# Patient Record
Sex: MALE | Race: WHITE | Employment: UNEMPLOYED | URBAN - METROPOLITAN AREA
[De-identification: names, ages, dates, MRNs, and addresses within clinical notes are randomized per-mention and may not be internally consistent; named-entity substitution may affect disease eponyms.]

---

## 2023-08-01 ENCOUNTER — HOSPITAL ENCOUNTER (EMERGENCY)
Age: 23
Discharge: HOME | End: 2023-08-01
Attending: EMERGENCY MEDICINE
Payer: COMMERCIAL

## 2023-08-01 VITALS
BODY MASS INDEX: 43.7 KG/M2 | HEIGHT: 67 IN | HEART RATE: 88 BPM | DIASTOLIC BLOOD PRESSURE: 115 MMHG | RESPIRATION RATE: 19 BRPM | WEIGHT: 278.4 LBS | OXYGEN SATURATION: 99 % | SYSTOLIC BLOOD PRESSURE: 131 MMHG

## 2023-08-01 DIAGNOSIS — E16.2 HYPOGLYCEMIA: Primary | ICD-10-CM

## 2023-08-01 LAB
CHP ED QC CHECK: NORMAL
GLUCOSE BLD-MCNC: 223 MG/DL
GLUCOSE BLD-MCNC: 223 MG/DL (ref 70–99)
GLUCOSE BLD-MCNC: 76 MG/DL (ref 70–99)
PERFORMED ON: ABNORMAL
PERFORMED ON: NORMAL

## 2023-08-01 PROCEDURE — 99283 EMERGENCY DEPT VISIT LOW MDM: CPT

## 2023-08-01 ASSESSMENT — LIFESTYLE VARIABLES
HOW OFTEN DO YOU HAVE A DRINK CONTAINING ALCOHOL: NEVER
HOW MANY STANDARD DRINKS CONTAINING ALCOHOL DO YOU HAVE ON A TYPICAL DAY: PATIENT DOES NOT DRINK

## 2023-08-01 ASSESSMENT — PAIN - FUNCTIONAL ASSESSMENT: PAIN_FUNCTIONAL_ASSESSMENT: NONE - DENIES PAIN

## 2023-08-01 NOTE — DISCHARGE INSTRUCTIONS
Today you were seen in the emergency department for low blood sugar. You were given sugar through your IV by paramedics, we had to eat and watch you for a while, your repeat blood sugar was normal.  We recommend that you make an appointment with your endocrinologist as soon as possible. You should not be alone in the next 24 hours and make sure that your roommate is able to check on you and call 911 if you were to pass out again. Please seek care or return to emergency department if your symptoms worsen or do not resolve. In addition, return if:  -This happens again  -You have a fever (greater than 101 degrees)  -You have chest pain, shortness of breath, abdominal pain, or uncontrollable vomiting  -You are unable to eat or drink  -You pass out  -You have difficulty moving your arms or legs   -You have difficulty speaking or slurred speech  -Or you have any concern that you feel needs urgent physician evaluation  Follow-Up/Future Appointments:  Please schedule an appointment with your endocrinologist as soon as possible. No future appointments.   Discharge Medications:  New Prescriptions    No medications on file     Thank you for allowing me to be a part of your care today - Dr. Tara Olvera

## 2023-08-02 NOTE — ED PROVIDER NOTES
[facility]          EM RESIDENT NOTE     Date of evaluation: 8/1/2023    Chief Complaint     Hypoglycemia (Pt working this morning with friend, felt shaky and laid down then had LOC. Friend called EMS, EMS reports pt's BS was 37. EMS reports they administered 250mL dextrose 10. Pt alert and oriented upon arrival to ED, although observed with some tremors )    History of Present Illness     Dipti Pollard is a 24 y.o. Tawnya Amirah a history of T1DM who presents via EMS for loss of consciousness and found to be hypoglycemic to 37. Per EMS patient was feeling weak, sweaty, unwell at work sat down and subsequently lost consciousness. Friend immediately called 911. He 37 and gave him 250 mL D10. Patient was appropriately waking up in route. On my evaluation he is tremulous, cool to the touch, sweaty, slow to respond but alert and conversant. He states that he takes his long-acting insulin at night. This morning he took 16 units of short acting insulin, but did not eat the entire breakfast he had plan for. He had not yet eaten lunch when this event occurred. He remembers sitting down but no further events. Unfortunately periods of hypoglycemia have happened to him multiple times before. He is not always transported by EMS but whenever this happens at work he comes to the hospital to be evaluated. Typically they give him food and observe him for a while and then he is discharged home. These episodes are always related to either not eating or strenuously working out in the heat. He denies any recent fevers, illnesses, changes to his insulin regimen. MEDICAL DECISION MAKING / ASSESSMENT / PLAN     INITIAL VITALS: BP: (!) 131/115,  , Pulse: 67, Respirations: 14, SpO2: 100 %    Dipti Pollard is a 24 y.o. male with a history of type 1 diabetes who presents for evaluation of hypoglycemia leading to loss of consciousness.   On my evaluation he is tremulous, cool to the

## 2023-08-03 NOTE — ED PROVIDER NOTES
ED Attending Attestation Note     Date of evaluation: 8/1/2023    This patient was seen by the resident. I have seen and examined the patient, agree with the workup, evaluation, management and diagnosis. The care plan has been discussed. My assessment reveals 39-year-old male with a past medical history significant for T1DM who presents with episode of hypoglycemia at work today. Took his normal amount of insulin and then did not eat lunch and has been working outside in the heat. At the time my assessment he feels much better. Able to tolerate p.o.  Glucose improved. As the patient has a clear inciting factor for his episode of hypoglycemia that has now resolved and not returned I think he can safely be discharged home with return precautions.      Toma Lanes, MD  08/02/23 7220